# Patient Record
Sex: FEMALE | Race: BLACK OR AFRICAN AMERICAN | NOT HISPANIC OR LATINO | Employment: UNEMPLOYED | URBAN - METROPOLITAN AREA
[De-identification: names, ages, dates, MRNs, and addresses within clinical notes are randomized per-mention and may not be internally consistent; named-entity substitution may affect disease eponyms.]

---

## 2021-07-05 ENCOUNTER — HOSPITAL ENCOUNTER (EMERGENCY)
Facility: HOSPITAL | Age: 61
Discharge: HOME | End: 2021-07-05
Attending: EMERGENCY MEDICINE
Payer: COMMERCIAL

## 2021-07-05 ENCOUNTER — APPOINTMENT (EMERGENCY)
Dept: RADIOLOGY | Facility: HOSPITAL | Age: 61
End: 2021-07-05
Payer: COMMERCIAL

## 2021-07-05 VITALS
BODY MASS INDEX: 45.4 KG/M2 | OXYGEN SATURATION: 96 % | WEIGHT: 216.27 LBS | DIASTOLIC BLOOD PRESSURE: 88 MMHG | SYSTOLIC BLOOD PRESSURE: 187 MMHG | TEMPERATURE: 98.1 F | HEART RATE: 94 BPM | HEIGHT: 58 IN | RESPIRATION RATE: 18 BRPM

## 2021-07-05 DIAGNOSIS — S86.001A: Primary | ICD-10-CM

## 2021-07-05 PROCEDURE — 99284 EMERGENCY DEPT VISIT MOD MDM: CPT | Performed by: PHYSICIAN ASSISTANT

## 2021-07-05 PROCEDURE — 73610 X-RAY EXAM OF ANKLE: CPT

## 2021-07-05 PROCEDURE — 99283 EMERGENCY DEPT VISIT LOW MDM: CPT

## 2021-07-05 PROCEDURE — 73630 X-RAY EXAM OF FOOT: CPT

## 2021-07-05 RX ORDER — IBUPROFEN 600 MG/1
600 TABLET ORAL ONCE
Status: COMPLETED | OUTPATIENT
Start: 2021-07-05 | End: 2021-07-05

## 2021-07-05 RX ORDER — ACETAMINOPHEN 325 MG/1
975 TABLET ORAL ONCE
Status: COMPLETED | OUTPATIENT
Start: 2021-07-05 | End: 2021-07-05

## 2021-07-05 RX ADMIN — ACETAMINOPHEN 975 MG: 325 TABLET, FILM COATED ORAL at 23:13

## 2021-07-05 RX ADMIN — IBUPROFEN 600 MG: 600 TABLET, FILM COATED ORAL at 23:13

## 2021-07-06 NOTE — DISCHARGE INSTRUCTIONS
Take otc Tylenol/Ibuprofen as needed for pain relief  Encourage rest, ice, and elevation of the right leg  Use crutches until seen by Orthopedics  Please follow up with Orthopedics as soon as possible for reassessment  Return immediately to the emergency department if you experience new or worsening symptoms as discussed

## 2021-07-06 NOTE — ED PROVIDER NOTES
History  Chief Complaint   Patient presents with    Foot Pain     pt states she was walking and got a sharp pain in her right foot  Pt's ankle appears swollen     Finger Pain     pt also reports left middle finger pain  Pt states when she sleeps it gets stuck      Franco Martinez is a 27-year-old female with past medical history including hypertension, hyperlipidemia, diabetes arriving ambulatory to the emergency department with chief complaint of right ankle pain/swelling  Patient reports spontaneous symptoms onset yesterday afternoon while walking  She does not recall any abnormal twisting movements of the right foot/ankle  She finds that her pain is localized to the achilles tendon  The patient reports subsequent limited range of motion of the right ankle  She denies any numbness or weakness of right foot/ankle, or remainder of right lower extremity  She denies pain or injury elsewhere  The patient has been able to ambulate since this injury though with increasing difficulty  The patient also endorses complaint of occasional discomfort in the left 3rd digit, stating that occasionally when the patient wakes up from sleep, the left 3rd digit is stuck" in a bent position requiring her to manually straighten her finger  The patient denies any known injuries  Currently asymptomatic of this at this time and appearance of the digit is unremarkable on inspection  She denies any numbness or weakness of the digit  She denies symptoms involving other digits  Patient notes symptoms have been ongoing for many months in this finger though this has not been previously evaluated by another provider  None       Past Medical History:   Diagnosis Date    Diabetes mellitus ([address] )     High cholesterol     Hypertension        History reviewed  No pertinent surgical history  History reviewed  No pertinent family history  I have reviewed and agree with the history as documented      E-Cigarette/Vaping E-Cigarette/Vaping Substances     Social History     Tobacco Use    Smoking status: Never Smoker    Smokeless tobacco: Never Used   Substance Use Topics    Alcohol use: Not on file    Drug use: Never       Review of Systems   Constitutional: Negative for chills, fatigue and fever  Musculoskeletal: Positive for arthralgias, joint swelling and myalgias  Right ankle pain/swelling   Skin: Negative for rash  Neurological: Negative for weakness and numbness  All other systems reviewed and are negative  Physical Exam  Physical Exam  Vitals and nursing note reviewed  Constitutional:       General: She is not in acute distress  Appearance: Normal appearance  She is well-developed  She is not ill-appearing, toxic-appearing or diaphoretic  Comments: Elevated BMI   HENT:      Head: Normocephalic and atraumatic  Eyes:      Conjunctiva/sclera: Conjunctivae normal    Cardiovascular:      Rate and Rhythm: Normal rate  Pulses:           Dorsalis pedis pulses are 2+ on the right side and 2+ on the left side  Pulmonary:      Effort: Pulmonary effort is normal  No respiratory distress  Musculoskeletal:      Cervical back: Normal range of motion and neck supple  No rigidity  Right lower leg: No edema  Left lower leg: No edema  Comments: Right lower extremity: Mild-to-moderate edema of the right foot and ankle on inspection, no obvious deformity  +Barr test concerning for Achilles rupture  The patient is minimally able to plantar flex and dorsiflex the right foot  Strong DP pulse, 2+  Distal sensation intact, capillary refill less than 2 seconds in all toes  The appearance of the left third digit is unremarkable  Patient has full active painless rom of MCP/PIP/DIP joints of the digit without issue  No fusiform swelling of the digit  No pain with passive stretch  No sensory deficits, capillary refill < 2 seconds  Radial pulse 2+ in the lue     Skin:     General: Skin is warm and dry  Capillary Refill: Capillary refill takes less than 2 seconds  Neurological:      General: No focal deficit present  Mental Status: She is alert  Mental status is at baseline  GCS: GCS eye subscore is 4  GCS verbal subscore is 5  GCS motor subscore is 6  Sensory: Sensation is intact  No sensory deficit  Motor: No weakness or abnormal muscle tone  Deep Tendon Reflexes: Reflexes are normal and symmetric  Vital Signs  ED Triage Vitals [07/05/21 2053]   Temperature Pulse Respirations Blood Pressure SpO2   98 1 °F (36 7 °C) 94 18 (!) 187/88 96 %      Temp Source Heart Rate Source Patient Position - Orthostatic VS BP Location FiO2 (%)   Oral Monitor Sitting Left arm --      Pain Score       --           Vitals:    07/05/21 2053   BP: (!) 187/88   Pulse: 94   Patient Position - Orthostatic VS: Sitting         Visual Acuity      ED Medications  Medications   acetaminophen (TYLENOL) tablet 975 mg (975 mg Oral Given 7/5/21 2313)   ibuprofen (MOTRIN) tablet 600 mg (600 mg Oral Given 7/5/21 2313)       Diagnostic Studies  Results Reviewed     None                 XR ankle 3+ views RIGHT    (Results Pending)   XR foot 3+ views RIGHT    (Results Pending)              Procedures  Procedures         ED Course                             SBIRT 20yo+      Most Recent Value   SBIRT (22 yo +)   In order to provide better care to our patients, we are screening all of our patients for alcohol and drug use  Would it be okay to ask you these screening questions? No Filed at: 07/05/2021 2314                    MDM  Number of Diagnoses or Management Options  Injury of Achilles tendon, right, initial encounter: new and requires workup  Diagnosis management comments: This is a pleasant 26-year-old female arriving to the emergency department with 2 separate complaints    Patient states that yesterday she had injured her right foot/ankle while walking, now with mild to moderate swelling of the right foot/ankle  Her pain is localized along the Achilles tendon  No associated neurovascular deficits  No prior injuries to this area  Patient also reports that left third digit occasionally gets "stuck" in bent position requiring manual straightening  No known injuries  Differential diagnosis includes but not limited to:  Rule out acute fracture/dislocation of the right foot/ankle, Achilles tendon tear/rupture, tendinitis, sprain, strain, effusion, osteoarthritis, djd  Sx in left third finger seem most consistent with the diagnosis of trigger finger  In absence of injury or symptoms at this time no indication for xray    Initial ED plan:  X-rays, pain control, splint/crutches    Final ED Assessment:  Vital signs on arrival for elevated blood pressure reading, examination as above  The right lower extremity and left upper extremity are neurovascularly intact  x-rays of right foot/ankle independently reviewed which are without acute osseous abnormality on my preliminary review  the patient's history and physical exam are most consistent with the injury to the Achilles tendon and possible rupture  Regarding the patient's complaint of finger pain, advised that her history is most consistent with trigger finger for which Hand Surgery/Ortho follow-up will be provided in discharge paperwork  Patient advised a plan for placement of splint to the right lower extremity to facilitate healing, provided crutches, and orthopedic follow-up, for which she is understanding and agreeable with  Splint check: location right lower extremity,   Type posterior short-leg with ankle stirrup, SILT, NVI, cap refill less than 3 seconds  Skin intact without redness or breakdown  Splint applied by ED tech  Splint checked by me  Advised rest, ice, elevation  Recommended Tylenol/ibuprofen as needed for pain    patient advised to return to the emergency department with new or worsening symptoms including but not limited to severe pain, sensation changes, motor weakness/paralysis, or any other new or worrisome symptoms  The patient had verbalized understanding and was agreeable with disposition plan  Her condition at time of discharge is stable  The patient has demonstrated the ability to use crutches while being observed in the emergency department  Patient is stable for discharge at this time  Amount and/or Complexity of Data Reviewed  Tests in the radiology section of CPT®: ordered and reviewed  Review and summarize past medical records: yes  Independent visualization of images, tracings, or specimens: yes    Risk of Complications, Morbidity, and/or Mortality  Presenting problems: low  Diagnostic procedures: low  Management options: low    Patient Progress  Patient progress: stable      Disposition  Final diagnoses:   Injury of Achilles tendon, right, initial encounter     Time reflects when diagnosis was documented in both MDM as applicable and the Disposition within this note     Time User Action Codes Description Comment    7/5/2021  9:53 PM Adiel Mederos Add [S86 001A] Injury of Achilles tendon, right, initial encounter       ED Disposition     ED Disposition Condition Date/Time Comment    Discharge Stable Mon Jul 5, 2021  9:54 PM Russ Mendez discharge to home/self care              Follow-up Information     Follow up With Specialties Details Why Contact Info Additional Information    [facility] Internal Medicine [facility] Internal Medicine  As needed [address] [facility] [address]  [address]  [place] [address] [address] Internal Medicine [facility], [address], [address], [place], [address] [facility] Call   [address] [place] [address] [address] [facility] [address] [phone] [address] [facility], [address] [place] [address], [address] [place], [place], [address], Denise Cruz [address] [facility] Emergency Department Emergency Medicine  If symptoms worsen [address] [facility] [id number] [facility] Emergency Department, [address] [place], [place], [facility]          There are no discharge medications for this patient  No discharge procedures on file      PDMP Review     None          ED Provider  Electronically Signed by           Reno Lee PA-C  07/06/21 8676

## 2021-07-14 ENCOUNTER — HOSPITAL ENCOUNTER (EMERGENCY)
Facility: HOSPITAL | Age: 61
Discharge: HOME | End: 2021-07-14
Attending: EMERGENCY MEDICINE
Payer: COMMERCIAL

## 2021-07-14 VITALS
SYSTOLIC BLOOD PRESSURE: 138 MMHG | TEMPERATURE: 98.1 F | HEART RATE: 100 BPM | RESPIRATION RATE: 18 BRPM | OXYGEN SATURATION: 98 % | DIASTOLIC BLOOD PRESSURE: 87 MMHG

## 2021-07-14 DIAGNOSIS — S86.001D INJURY OF ACHILLES TENDON, RIGHT, SUBSEQUENT ENCOUNTER: Primary | ICD-10-CM

## 2021-07-14 PROCEDURE — 99284 EMERGENCY DEPT VISIT MOD MDM: CPT | Performed by: PHYSICIAN ASSISTANT

## 2021-07-14 PROCEDURE — 96372 THER/PROPH/DIAG INJ SC/IM: CPT

## 2021-07-14 PROCEDURE — 99283 EMERGENCY DEPT VISIT LOW MDM: CPT

## 2021-07-14 RX ORDER — KETOROLAC TROMETHAMINE 30 MG/ML
15 INJECTION, SOLUTION INTRAMUSCULAR; INTRAVENOUS ONCE
Status: COMPLETED | OUTPATIENT
Start: 2021-07-14 | End: 2021-07-14

## 2021-07-14 RX ORDER — ALBUTEROL SULFATE 90 UG/1
2 AEROSOL, METERED RESPIRATORY (INHALATION) EVERY 6 HOURS PRN
COMMUNITY

## 2021-07-14 RX ORDER — FLUTICASONE PROPIONATE 44 UG/1
2 AEROSOL, METERED RESPIRATORY (INHALATION) 2 TIMES DAILY
COMMUNITY

## 2021-07-14 RX ADMIN — KETOROLAC TROMETHAMINE 15 MG: 30 INJECTION, SOLUTION INTRAMUSCULAR at 22:04

## 2021-07-15 NOTE — ED PROVIDER NOTES
History  Chief Complaint   Patient presents with    Foot Pain     R foot pain, pt states she was put in a splint on the 5th but cannot get it back on her leg now, reports severe pain in lateral side of R foot      Patient is a 28-year-old female with a past medical history significant for diabetes, hypertension, hyperlipidemia who presents to the emergency department for evaluation of right Achilles pain that has been ongoing for over 1 week now  Patient was seen in our emergency department on July 5th and diagnosed with an Achilles injury, possibly Achilles rupture  Patient was placed in a posterior short-leg splint and instructed to follow-up with orthopedics  Patient did not follow-up with orthopedics, she removed her splint because it was irritating her  Patient has also been bearing weight on her right lower extremity, she is not using her crutches  Patient reports persistent pain to her right Achilles and right ankle  Patient did have x-rays of her foot and ankle done at that time which were unremarkable  Patient not having any other complaints at this time  Prior to Admission Medications   Prescriptions Last Dose Informant Patient Reported? Taking? AMLODIPINE BESYLATE PO   Yes Yes   Sig: Take by mouth   ATORVASTATIN CALCIUM PO   Yes Yes   Sig: Take by mouth   GLIPIZIDE ER PO   Yes Yes   Sig: Take by mouth   Insulin Glargine (BASAGLAR KWIKPEN SC)   Yes Yes   Sig: Inject under the skin   LOSARTAN POTASSIUM PO   Yes Yes   Sig: Take by mouth   SITagliptin Phosphate (JANUVIA PO)   Yes Yes   Sig: Take by mouth   albuterol (PROVENTIL HFA,VENTOLIN HFA) 90 mcg/act inhaler   Yes Yes   Sig: Inhale 2 puffs every 6 (six) hours as needed for wheezing   fluticasone (FLOVENT HFA) 44 mcg/act inhaler   Yes Yes   Sig: Inhale 2 puffs 2 (two) times a day Rinse mouth after use     insulin regular (HumuLIN R,NovoLIN R) 100 units/mL injection   Yes Yes   Sig: Inject under the skin      Facility-Administered Medications: None       Past Medical History:   Diagnosis Date    Diabetes mellitus ([facility] [address] )     High cholesterol     Hypertension        History reviewed  No pertinent surgical history  History reviewed  No pertinent family history  I have reviewed and agree with the history as documented  E-Cigarette/Vaping     E-Cigarette/Vaping Substances     Social History     Tobacco Use    Smoking status: Never Smoker    Smokeless tobacco: Never Used   Substance Use Topics    Alcohol use: Not Currently    Drug use: Never       Review of Systems   Constitutional: Negative for chills, diaphoresis and fever  HENT: Negative for congestion, facial swelling, nosebleeds, sore throat and voice change  Eyes: Negative for pain and redness  Respiratory: Negative for cough, choking, chest tightness, shortness of breath and stridor  Cardiovascular: Negative for chest pain and palpitations  Gastrointestinal: Negative for abdominal pain, diarrhea, nausea and vomiting  Genitourinary: Negative for difficulty urinating, dysuria, flank pain, hematuria, vaginal bleeding and vaginal discharge  Musculoskeletal: Positive for arthralgias (Right ankle, right Achilles) and joint swelling ( right ankle, right foot)  Negative for back pain, myalgias, neck pain and neck stiffness  Skin: Negative for color change and rash  Neurological: Negative for dizziness, syncope, facial asymmetry, weakness, light-headedness, numbness and headaches  Psychiatric/Behavioral: Negative for confusion and suicidal ideas  All other systems reviewed and are negative  Physical Exam  Physical Exam  Vitals reviewed  Constitutional:       General: She is not in acute distress  Appearance: Normal appearance  She is not ill-appearing, toxic-appearing or diaphoretic  HENT:      Head: Normocephalic and atraumatic        Right Ear: External ear normal       Left Ear: External ear normal       Nose: Nose normal  No congestion or rhinorrhea  Mouth/Throat:      Mouth: Mucous membranes are moist       Pharynx: Oropharynx is clear  No oropharyngeal exudate or posterior oropharyngeal erythema  Eyes:      General: No scleral icterus  Right eye: No discharge  Left eye: No discharge  Extraocular Movements: Extraocular movements intact  Conjunctiva/sclera: Conjunctivae normal       Pupils: Pupils are equal, round, and reactive to light  Cardiovascular:      Rate and Rhythm: Normal rate and regular rhythm  Pulses: Normal pulses  Heart sounds: Normal heart sounds  No murmur heard  No friction rub  No gallop  Pulmonary:      Effort: Pulmonary effort is normal  No respiratory distress  Breath sounds: Normal breath sounds  No stridor  No wheezing, rhonchi or rales  Abdominal:      General: Abdomen is flat  Palpations: Abdomen is soft  Tenderness: There is no abdominal tenderness  There is no guarding or rebound  Musculoskeletal:      Cervical back: Normal range of motion and neck supple  Right lower leg: No edema  Left lower leg: No edema  Right ankle: Swelling present  Decreased range of motion  Right Achilles Tendon: Tenderness present  Barr's test positive  Skin:     General: Skin is warm and dry  Capillary Refill: Capillary refill takes less than 2 seconds  Neurological:      General: No focal deficit present  Mental Status: She is alert and oriented to person, place, and time     Psychiatric:         Mood and Affect: Mood normal          Behavior: Behavior normal          Vital Signs  ED Triage Vitals [07/14/21 2039]   Temperature Pulse Respirations Blood Pressure SpO2   98 1 °F (36 7 °C) 100 18 138/87 98 %      Temp Source Heart Rate Source Patient Position - Orthostatic VS BP Location FiO2 (%)   Oral Monitor Sitting Left arm --      Pain Score       --           Vitals:    07/14/21 2039   BP: 138/87   Pulse: 100   Patient Position - Orthostatic VS: Sitting         Visual Acuity      ED Medications  Medications   ketorolac (TORADOL) injection 15 mg (15 mg Intramuscular Given 7/14/21 3122)       Diagnostic Studies  Results Reviewed     None                 No orders to display              Procedures  Procedures         ED Course                                           MDM  Number of Diagnoses or Management Options  Injury of Achilles tendon, right, subsequent encounter  Diagnosis management comments: Patient is a 69-year-old female with a past medical history significant for diabetes, hypertension, hyperlipidemia who presents to the emergency department for evaluation of right Achilles pain that has been ongoing for over 1 week now  Patient was seen in our emergency department on July 5th and diagnosed with an Achilles injury, possibly Achilles rupture  Patient was placed in a posterior short-leg splint and instructed to follow-up with orthopedics  Patient did not follow-up with orthopedics, she removed her splint because it was irritating her  Patient has also been bearing weight on her right lower extremity, she is not using her crutches  Patient reports persistent pain to her right Achilles and right ankle  Patient did have x-rays of her foot and ankle done at that time which were unremarkable  Patient not having any other complaints at this time  Patient appears comfortable in bed, she is not any acute distress  Her vital signs are normal   She does have tenderness over her right Achilles  There is swelling to the ankle and foot  Barr test was positive  Patient was diagnosed with Achilles injury on July 5th and placed in a splint  Patient has been bearing weight on her right lower extremity despite instructions to use crutches and be nonweightbearing  Patient also did not follow-up with orthopedics as instructed  Patient was given Toradol in the emergency department    She was also placed in a posterior short-leg splint in plantar flexion  I had a long conversation with the patient about the potential for a complete Achilles rupture and the importance of following up with Orthopedics  I also instructed the patient that she should not bear any weight on her right lower extremity  Patient verbalizes understanding  Patient was again given and information on how to follow-up with orthopedics  Patient was given very strict instructions on when she should return to the emergency department      Patient Progress  Patient progress: stable      Disposition  Final diagnoses:   Injury of Achilles tendon, right, subsequent encounter     Time reflects when diagnosis was documented in both MDM as applicable and the Disposition within this note     Time User Action Codes Description Comment    7/14/2021  9:45 PM Binu Cochran Add [S86 001D] Injury of Achilles tendon, right, subsequent encounter       ED Disposition     ED Disposition Condition Date/Time Comment    Discharge Stable Wed Jul 14, 2021 10:02 PM Dorina Byrne discharge to home/self care              Follow-up Information     Follow up With Specialties Details Why Contact Info Additional [address] [facility] Orthopedic Surgery Schedule an appointment as soon as possible for a visit  for follow up [address] [place] [address] Orlin Christiansen 42 [phone] 230 [facility], [address] [address] [place], [place], [phone] [facility] Emergency Department Emergency Medicine Go to  If symptoms worsen 34 [facility] 109 [facility] Emergency Department, [address] [place] [address], [address]          Discharge Medication List as of 7/14/2021 10:03 PM      CONTINUE these medications which have NOT CHANGED    Details   albuterol (PROVENTIL HFA,VENTOLIN HFA) 90 mcg/act inhaler Inhale 2 puffs every 6 (six) hours as needed for wheezing, Historical Med      AMLODIPINE BESYLATE PO Take by mouth, Historical Med      ATORVASTATIN CALCIUM PO Take by mouth, Historical Med      fluticasone (FLOVENT HFA) 44 mcg/act inhaler Inhale 2 puffs 2 (two) times a day Rinse mouth after use , Historical Med      GLIPIZIDE ER PO Take by mouth, Historical Med      Insulin Glargine (BASAGLAR KWIKPEN SC) Inject under the skin, Historical Med      insulin regular (HumuLIN R,NovoLIN R) 100 units/mL injection Inject under the skin, Historical Med      LOSARTAN POTASSIUM PO Take by mouth, Historical Med      SITagliptin Phosphate (JANUVIA PO) Take by mouth, Historical Med           No discharge procedures on file      PDMP Review     None          ED Provider  Electronically Signed by           Bowen Ricketts PA-C  07/15/21 8569

## 2021-07-15 NOTE — ED NOTES
Pt states the splint was irritating to the outside of her foot  As she planned on following up in the [facility], this Rn attempted to get Xray to burn a disk of the images from last visit  Unfortunately, the burner wasn't working  Pt given information by rad tech about how to have the images sent to her doc       Talib Castillo RN  07/14/21 7569